# Patient Record
Sex: MALE | HISPANIC OR LATINO | URBAN - METROPOLITAN AREA
[De-identification: names, ages, dates, MRNs, and addresses within clinical notes are randomized per-mention and may not be internally consistent; named-entity substitution may affect disease eponyms.]

---

## 2024-03-04 PROBLEM — M79.672 PAIN OF LEFT FOOT: Status: ACTIVE | Noted: 2024-03-04

## 2024-03-12 ENCOUNTER — HOSPITAL ENCOUNTER (OUTPATIENT)
Facility: MEDICAL CENTER | Age: 39
End: 2024-03-12
Attending: EMERGENCY MEDICINE | Admitting: ORTHOPAEDIC SURGERY
Payer: SELF-PAY

## 2024-03-12 ENCOUNTER — APPOINTMENT (OUTPATIENT)
Dept: RADIOLOGY | Facility: MEDICAL CENTER | Age: 39
End: 2024-03-12
Attending: ORTHOPAEDIC SURGERY

## 2024-03-12 ENCOUNTER — ANESTHESIA EVENT (OUTPATIENT)
Dept: SURGERY | Facility: MEDICAL CENTER | Age: 39
End: 2024-03-12

## 2024-03-12 ENCOUNTER — ANESTHESIA (OUTPATIENT)
Dept: SURGERY | Facility: MEDICAL CENTER | Age: 39
End: 2024-03-12

## 2024-03-12 VITALS
OXYGEN SATURATION: 92 % | HEIGHT: 69 IN | HEART RATE: 99 BPM | DIASTOLIC BLOOD PRESSURE: 78 MMHG | RESPIRATION RATE: 16 BRPM | WEIGHT: 265 LBS | BODY MASS INDEX: 39.25 KG/M2 | TEMPERATURE: 97.4 F | SYSTOLIC BLOOD PRESSURE: 147 MMHG

## 2024-03-12 DIAGNOSIS — S82.302A CLOSED FRACTURE OF DISTAL END OF LEFT TIBIA, UNSPECIFIED FRACTURE MORPHOLOGY, INITIAL ENCOUNTER: ICD-10-CM

## 2024-03-12 DIAGNOSIS — M79.605 LEFT LEG PAIN: ICD-10-CM

## 2024-03-12 PROCEDURE — A9270 NON-COVERED ITEM OR SERVICE: HCPCS | Performed by: STUDENT IN AN ORGANIZED HEALTH CARE EDUCATION/TRAINING PROGRAM

## 2024-03-12 PROCEDURE — 700111 HCHG RX REV CODE 636 W/ 250 OVERRIDE (IP): Performed by: STUDENT IN AN ORGANIZED HEALTH CARE EDUCATION/TRAINING PROGRAM

## 2024-03-12 PROCEDURE — 700105 HCHG RX REV CODE 258: Performed by: STUDENT IN AN ORGANIZED HEALTH CARE EDUCATION/TRAINING PROGRAM

## 2024-03-12 PROCEDURE — 27695 REPAIR OF ANKLE LIGAMENT: CPT | Mod: LT | Performed by: ORTHOPAEDIC SURGERY

## 2024-03-12 PROCEDURE — 700101 HCHG RX REV CODE 250: Performed by: STUDENT IN AN ORGANIZED HEALTH CARE EDUCATION/TRAINING PROGRAM

## 2024-03-12 PROCEDURE — 700102 HCHG RX REV CODE 250 W/ 637 OVERRIDE(OP): Performed by: EMERGENCY MEDICINE

## 2024-03-12 PROCEDURE — 160036 HCHG PACU - EA ADDL 30 MINS PHASE I: Performed by: ORTHOPAEDIC SURGERY

## 2024-03-12 PROCEDURE — 27814 TREATMENT OF ANKLE FRACTURE: CPT | Mod: LT | Performed by: ORTHOPAEDIC SURGERY

## 2024-03-12 PROCEDURE — 700102 HCHG RX REV CODE 250 W/ 637 OVERRIDE(OP): Performed by: STUDENT IN AN ORGANIZED HEALTH CARE EDUCATION/TRAINING PROGRAM

## 2024-03-12 PROCEDURE — 27829 TREAT LOWER LEG JOINT: CPT | Mod: ASROC,LT | Performed by: NURSE PRACTITIONER

## 2024-03-12 PROCEDURE — 700111 HCHG RX REV CODE 636 W/ 250 OVERRIDE (IP): Performed by: ORTHOPAEDIC SURGERY

## 2024-03-12 PROCEDURE — 160035 HCHG PACU - 1ST 60 MINS PHASE I: Performed by: ORTHOPAEDIC SURGERY

## 2024-03-12 PROCEDURE — A9270 NON-COVERED ITEM OR SERVICE: HCPCS | Performed by: EMERGENCY MEDICINE

## 2024-03-12 PROCEDURE — 27829 TREAT LOWER LEG JOINT: CPT | Mod: LT | Performed by: ORTHOPAEDIC SURGERY

## 2024-03-12 PROCEDURE — 27814 TREATMENT OF ANKLE FRACTURE: CPT | Mod: ASROC,LT | Performed by: NURSE PRACTITIONER

## 2024-03-12 PROCEDURE — 160025 RECOVERY II MINUTES (STATS): Performed by: ORTHOPAEDIC SURGERY

## 2024-03-12 PROCEDURE — 36415 COLL VENOUS BLD VENIPUNCTURE: CPT

## 2024-03-12 PROCEDURE — 110371 HCHG SHELL REV 272: Performed by: ORTHOPAEDIC SURGERY

## 2024-03-12 PROCEDURE — 28446 OPN OSTEOCHONDRL AGRFT TALUS: CPT | Mod: LT | Performed by: ORTHOPAEDIC SURGERY

## 2024-03-12 PROCEDURE — 27695 REPAIR OF ANKLE LIGAMENT: CPT | Mod: ASROC,LT | Performed by: NURSE PRACTITIONER

## 2024-03-12 PROCEDURE — 160009 HCHG ANES TIME/MIN: Performed by: ORTHOPAEDIC SURGERY

## 2024-03-12 PROCEDURE — C1713 ANCHOR/SCREW BN/BN,TIS/BN: HCPCS | Performed by: ORTHOPAEDIC SURGERY

## 2024-03-12 PROCEDURE — 28446 OPN OSTEOCHONDRL AGRFT TALUS: CPT | Mod: ASROC,LT | Performed by: NURSE PRACTITIONER

## 2024-03-12 PROCEDURE — 160002 HCHG RECOVERY MINUTES (STAT): Performed by: ORTHOPAEDIC SURGERY

## 2024-03-12 PROCEDURE — 700111 HCHG RX REV CODE 636 W/ 250 OVERRIDE (IP): Mod: JZ | Performed by: STUDENT IN AN ORGANIZED HEALTH CARE EDUCATION/TRAINING PROGRAM

## 2024-03-12 PROCEDURE — 160041 HCHG SURGERY MINUTES - EA ADDL 1 MIN LEVEL 4: Performed by: ORTHOPAEDIC SURGERY

## 2024-03-12 PROCEDURE — 160029 HCHG SURGERY MINUTES - 1ST 30 MINS LEVEL 4: Performed by: ORTHOPAEDIC SURGERY

## 2024-03-12 PROCEDURE — 160048 HCHG OR STATISTICAL LEVEL 1-5: Performed by: ORTHOPAEDIC SURGERY

## 2024-03-12 PROCEDURE — 73610 X-RAY EXAM OF ANKLE: CPT | Mod: LT

## 2024-03-12 PROCEDURE — 99291 CRITICAL CARE FIRST HOUR: CPT

## 2024-03-12 PROCEDURE — 160046 HCHG PACU - 1ST 60 MINS PHASE II: Performed by: ORTHOPAEDIC SURGERY

## 2024-03-12 DEVICE — WIRE FIXATION KIRSCHNER L150 MM OD1.6 MM: Type: IMPLANTABLE DEVICE | Site: ANKLE | Status: FUNCTIONAL

## 2024-03-12 DEVICE — PLATE BONE 1/3 TUBULAR L83 MM FOOT ANKLE 7 HOLE NONSTERILE: Type: IMPLANTABLE DEVICE | Site: ANKLE | Status: FUNCTIONAL

## 2024-03-12 DEVICE — DEVICE FIXATION GRAVITY SYNCHFIX SYNDESMOSIS (1EA): Type: IMPLANTABLE DEVICE | Site: ANKLE | Status: FUNCTIONAL

## 2024-03-12 DEVICE — SCREW BONE VARIAX T10 FULL THREAD L14 MM OD3.5 MM FOOT ANKLE STARDRIVE NONSTERILE: Type: IMPLANTABLE DEVICE | Site: ANKLE | Status: FUNCTIONAL

## 2024-03-12 DEVICE — SCREW BONE VARIAX T10 FULL THREAD L12 MM OD3.5 MM FOOT ANKLE STARDRIVE NONSTERILE: Type: IMPLANTABLE DEVICE | Site: ANKLE | Status: FUNCTIONAL

## 2024-03-12 RX ORDER — HYDRALAZINE HYDROCHLORIDE 20 MG/ML
5 INJECTION INTRAMUSCULAR; INTRAVENOUS
Status: DISCONTINUED | OUTPATIENT
Start: 2024-03-12 | End: 2024-03-12 | Stop reason: HOSPADM

## 2024-03-12 RX ORDER — CELECOXIB 200 MG/1
200 CAPSULE ORAL 2 TIMES DAILY
Status: DISCONTINUED | OUTPATIENT
Start: 2024-03-12 | End: 2024-03-12 | Stop reason: HOSPADM

## 2024-03-12 RX ORDER — OXYCODONE HYDROCHLORIDE 5 MG/1
5 TABLET ORAL
Status: DISCONTINUED | OUTPATIENT
Start: 2024-03-12 | End: 2024-03-12 | Stop reason: HOSPADM

## 2024-03-12 RX ORDER — OXYCODONE HCL 5 MG/5 ML
5 SOLUTION, ORAL ORAL
Status: COMPLETED | OUTPATIENT
Start: 2024-03-12 | End: 2024-03-12

## 2024-03-12 RX ORDER — ACETAMINOPHEN 500 MG
1000 TABLET ORAL EVERY 6 HOURS
Status: DISCONTINUED | OUTPATIENT
Start: 2024-03-12 | End: 2024-03-12 | Stop reason: HOSPADM

## 2024-03-12 RX ORDER — ONDANSETRON 2 MG/ML
INJECTION INTRAMUSCULAR; INTRAVENOUS PRN
Status: DISCONTINUED | OUTPATIENT
Start: 2024-03-12 | End: 2024-03-12 | Stop reason: SURG

## 2024-03-12 RX ORDER — CELECOXIB 200 MG/1
200 CAPSULE ORAL 2 TIMES DAILY PRN
Status: DISCONTINUED | OUTPATIENT
Start: 2024-03-17 | End: 2024-03-12 | Stop reason: HOSPADM

## 2024-03-12 RX ORDER — LABETALOL HYDROCHLORIDE 5 MG/ML
5 INJECTION, SOLUTION INTRAVENOUS
Status: DISCONTINUED | OUTPATIENT
Start: 2024-03-12 | End: 2024-03-12 | Stop reason: HOSPADM

## 2024-03-12 RX ORDER — OXYCODONE HYDROCHLORIDE AND ACETAMINOPHEN 5; 325 MG/1; MG/1
1 TABLET ORAL ONCE
Status: COMPLETED | OUTPATIENT
Start: 2024-03-12 | End: 2024-03-12

## 2024-03-12 RX ORDER — CEFAZOLIN SODIUM 1 G/3ML
INJECTION, POWDER, FOR SOLUTION INTRAMUSCULAR; INTRAVENOUS PRN
Status: DISCONTINUED | OUTPATIENT
Start: 2024-03-12 | End: 2024-03-12 | Stop reason: HOSPADM

## 2024-03-12 RX ORDER — BUPIVACAINE HYDROCHLORIDE 5 MG/ML
INJECTION, SOLUTION EPIDURAL; INTRACAUDAL
Status: DISCONTINUED | OUTPATIENT
Start: 2024-03-12 | End: 2024-03-12 | Stop reason: HOSPADM

## 2024-03-12 RX ORDER — MIDAZOLAM HYDROCHLORIDE 1 MG/ML
INJECTION INTRAMUSCULAR; INTRAVENOUS PRN
Status: DISCONTINUED | OUTPATIENT
Start: 2024-03-12 | End: 2024-03-12 | Stop reason: SURG

## 2024-03-12 RX ORDER — IPRATROPIUM BROMIDE AND ALBUTEROL SULFATE 2.5; .5 MG/3ML; MG/3ML
3 SOLUTION RESPIRATORY (INHALATION)
Status: DISCONTINUED | OUTPATIENT
Start: 2024-03-12 | End: 2024-03-12 | Stop reason: HOSPADM

## 2024-03-12 RX ORDER — HALOPERIDOL 5 MG/ML
1 INJECTION INTRAMUSCULAR
Status: DISCONTINUED | OUTPATIENT
Start: 2024-03-12 | End: 2024-03-12 | Stop reason: HOSPADM

## 2024-03-12 RX ORDER — DEXAMETHASONE SODIUM PHOSPHATE 4 MG/ML
INJECTION, SOLUTION INTRA-ARTICULAR; INTRALESIONAL; INTRAMUSCULAR; INTRAVENOUS; SOFT TISSUE PRN
Status: DISCONTINUED | OUTPATIENT
Start: 2024-03-12 | End: 2024-03-12 | Stop reason: SURG

## 2024-03-12 RX ORDER — OXYCODONE HYDROCHLORIDE 5 MG/1
5 TABLET ORAL EVERY 4 HOURS PRN
Qty: 20 TABLET | Refills: 0 | Status: SHIPPED | OUTPATIENT
Start: 2024-03-12 | End: 2024-03-17

## 2024-03-12 RX ORDER — METOPROLOL TARTRATE 1 MG/ML
1 INJECTION, SOLUTION INTRAVENOUS
Status: DISCONTINUED | OUTPATIENT
Start: 2024-03-12 | End: 2024-03-12 | Stop reason: HOSPADM

## 2024-03-12 RX ORDER — HYDROMORPHONE HYDROCHLORIDE 1 MG/ML
0.2 INJECTION, SOLUTION INTRAMUSCULAR; INTRAVENOUS; SUBCUTANEOUS
Status: DISCONTINUED | OUTPATIENT
Start: 2024-03-12 | End: 2024-03-12 | Stop reason: HOSPADM

## 2024-03-12 RX ORDER — OXYCODONE HCL 5 MG/5 ML
10 SOLUTION, ORAL ORAL
Status: COMPLETED | OUTPATIENT
Start: 2024-03-12 | End: 2024-03-12

## 2024-03-12 RX ORDER — ASPIRIN 81 MG/1
81 TABLET ORAL 2 TIMES DAILY
Status: DISCONTINUED | OUTPATIENT
Start: 2024-03-12 | End: 2024-03-12 | Stop reason: HOSPADM

## 2024-03-12 RX ORDER — LIDOCAINE HYDROCHLORIDE 20 MG/ML
INJECTION, SOLUTION EPIDURAL; INFILTRATION; INTRACAUDAL; PERINEURAL PRN
Status: DISCONTINUED | OUTPATIENT
Start: 2024-03-12 | End: 2024-03-12 | Stop reason: SURG

## 2024-03-12 RX ORDER — HYDROMORPHONE HYDROCHLORIDE 1 MG/ML
0.4 INJECTION, SOLUTION INTRAMUSCULAR; INTRAVENOUS; SUBCUTANEOUS
Status: DISCONTINUED | OUTPATIENT
Start: 2024-03-12 | End: 2024-03-12 | Stop reason: HOSPADM

## 2024-03-12 RX ORDER — SODIUM CHLORIDE, SODIUM LACTATE, POTASSIUM CHLORIDE, CALCIUM CHLORIDE 600; 310; 30; 20 MG/100ML; MG/100ML; MG/100ML; MG/100ML
INJECTION, SOLUTION INTRAVENOUS
Status: DISCONTINUED | OUTPATIENT
Start: 2024-03-12 | End: 2024-03-12 | Stop reason: HOSPADM

## 2024-03-12 RX ORDER — EPHEDRINE SULFATE 50 MG/ML
5 INJECTION, SOLUTION INTRAVENOUS
Status: DISCONTINUED | OUTPATIENT
Start: 2024-03-12 | End: 2024-03-12 | Stop reason: HOSPADM

## 2024-03-12 RX ORDER — HYDROMORPHONE HYDROCHLORIDE 1 MG/ML
0.5 INJECTION, SOLUTION INTRAMUSCULAR; INTRAVENOUS; SUBCUTANEOUS
Status: DISCONTINUED | OUTPATIENT
Start: 2024-03-12 | End: 2024-03-12 | Stop reason: HOSPADM

## 2024-03-12 RX ORDER — ONDANSETRON 2 MG/ML
4 INJECTION INTRAMUSCULAR; INTRAVENOUS
Status: DISCONTINUED | OUTPATIENT
Start: 2024-03-12 | End: 2024-03-12 | Stop reason: HOSPADM

## 2024-03-12 RX ORDER — OXYCODONE HYDROCHLORIDE 5 MG/1
10 TABLET ORAL
Status: DISCONTINUED | OUTPATIENT
Start: 2024-03-12 | End: 2024-03-12 | Stop reason: HOSPADM

## 2024-03-12 RX ORDER — DIPHENHYDRAMINE HYDROCHLORIDE 50 MG/ML
12.5 INJECTION INTRAMUSCULAR; INTRAVENOUS
Status: DISCONTINUED | OUTPATIENT
Start: 2024-03-12 | End: 2024-03-12 | Stop reason: HOSPADM

## 2024-03-12 RX ORDER — HYDROMORPHONE HYDROCHLORIDE 1 MG/ML
0.1 INJECTION, SOLUTION INTRAMUSCULAR; INTRAVENOUS; SUBCUTANEOUS
Status: DISCONTINUED | OUTPATIENT
Start: 2024-03-12 | End: 2024-03-12 | Stop reason: HOSPADM

## 2024-03-12 RX ORDER — ACETAMINOPHEN 500 MG
1000 TABLET ORAL EVERY 6 HOURS PRN
Status: DISCONTINUED | OUTPATIENT
Start: 2024-03-17 | End: 2024-03-12 | Stop reason: HOSPADM

## 2024-03-12 RX ADMIN — ROCURONIUM BROMIDE 50 MG: 10 INJECTION, SOLUTION INTRAVENOUS at 08:29

## 2024-03-12 RX ADMIN — OXYCODONE HYDROCHLORIDE AND ACETAMINOPHEN 1 TABLET: 5; 325 TABLET ORAL at 07:03

## 2024-03-12 RX ADMIN — FENTANYL CITRATE 50 MCG: 50 INJECTION, SOLUTION INTRAMUSCULAR; INTRAVENOUS at 10:00

## 2024-03-12 RX ADMIN — HYDROMORPHONE HYDROCHLORIDE 0.4 MG: 1 INJECTION, SOLUTION INTRAMUSCULAR; INTRAVENOUS; SUBCUTANEOUS at 10:13

## 2024-03-12 RX ADMIN — OXYCODONE HYDROCHLORIDE 10 MG: 5 SOLUTION ORAL at 10:00

## 2024-03-12 RX ADMIN — FENTANYL CITRATE 50 MCG: 50 INJECTION, SOLUTION INTRAMUSCULAR; INTRAVENOUS at 09:36

## 2024-03-12 RX ADMIN — MIDAZOLAM HYDROCHLORIDE 2 MG: 1 INJECTION, SOLUTION INTRAMUSCULAR; INTRAVENOUS at 08:26

## 2024-03-12 RX ADMIN — CEFAZOLIN 3 G: 1 INJECTION, POWDER, FOR SOLUTION INTRAMUSCULAR; INTRAVENOUS at 08:31

## 2024-03-12 RX ADMIN — PROPOFOL 150 MG: 10 INJECTION, EMULSION INTRAVENOUS at 08:29

## 2024-03-12 RX ADMIN — FENTANYL CITRATE 50 MCG: 50 INJECTION, SOLUTION INTRAMUSCULAR; INTRAVENOUS at 08:38

## 2024-03-12 RX ADMIN — SUGAMMADEX 200 MG: 100 INJECTION, SOLUTION INTRAVENOUS at 09:43

## 2024-03-12 RX ADMIN — HYDROMORPHONE HYDROCHLORIDE 0.2 MG: 1 INJECTION, SOLUTION INTRAMUSCULAR; INTRAVENOUS; SUBCUTANEOUS at 10:45

## 2024-03-12 RX ADMIN — DEXAMETHASONE SODIUM PHOSPHATE 8 MG: 4 INJECTION INTRA-ARTICULAR; INTRALESIONAL; INTRAMUSCULAR; INTRAVENOUS; SOFT TISSUE at 08:31

## 2024-03-12 RX ADMIN — LIDOCAINE HYDROCHLORIDE 100 MG: 20 INJECTION, SOLUTION EPIDURAL; INFILTRATION; INTRACAUDAL at 08:26

## 2024-03-12 RX ADMIN — ROCURONIUM BROMIDE 10 MG: 10 INJECTION, SOLUTION INTRAVENOUS at 08:47

## 2024-03-12 RX ADMIN — SODIUM CHLORIDE, POTASSIUM CHLORIDE, SODIUM LACTATE AND CALCIUM CHLORIDE: 600; 310; 30; 20 INJECTION, SOLUTION INTRAVENOUS at 08:23

## 2024-03-12 RX ADMIN — FENTANYL CITRATE 50 MCG: 50 INJECTION, SOLUTION INTRAMUSCULAR; INTRAVENOUS at 08:26

## 2024-03-12 RX ADMIN — ONDANSETRON 4 MG: 2 INJECTION INTRAMUSCULAR; INTRAVENOUS at 08:31

## 2024-03-12 RX ADMIN — HYDROMORPHONE HYDROCHLORIDE 0.4 MG: 1 INJECTION, SOLUTION INTRAMUSCULAR; INTRAVENOUS; SUBCUTANEOUS at 10:25

## 2024-03-12 RX ADMIN — FENTANYL CITRATE 50 MCG: 50 INJECTION, SOLUTION INTRAMUSCULAR; INTRAVENOUS at 10:11

## 2024-03-12 ASSESSMENT — PAIN SCALES - GENERAL: PAIN_LEVEL: 5

## 2024-03-12 ASSESSMENT — PAIN DESCRIPTION - PAIN TYPE
TYPE: SURGICAL PAIN
TYPE: SURGICAL PAIN
TYPE: ACUTE PAIN
TYPE: SURGICAL PAIN

## 2024-03-12 NOTE — ANESTHESIA POSTPROCEDURE EVALUATION
Patient: Manuel Reid    Procedure Summary       Date: 03/12/24 Room / Location: [facility] 12 / SURGERY [facility]    Anesthesia Start: 0823 Anesthesia Stop: 0957    Procedure: ORIF, ANKLE, SYNDESMOSIS REPAIR, DRILLING OSTEOCHONDRAL DEFECT, DELTOID REPAIR (Left: Ankle) Diagnosis: (left ankle fracture and syndesmotic disruption, osteochondral defect)    Surgeons: Russ Singh M.D. Responsible Provider: Russ Mohan M.D.    Anesthesia Type: general ASA Status: 3 - Emergent            Final Anesthesia Type: general  Last vitals  BP   Blood Pressure: (!) 147/78    Temp   36.3 °C (97.4 °F)    Pulse   99   Resp   16    SpO2   92 %      Anesthesia Post Evaluation    Patient location during evaluation: PACU  Patient participation: complete - patient participated  Level of consciousness: awake and alert  Pain score: 5    Airway patency: patent  Anesthetic complications: no  Cardiovascular status: hemodynamically stable  Respiratory status: acceptable  Hydration status: euvolemic    PONV: none          No notable events documented.     Nurse Pain Score: 5 (NPRS)

## 2024-03-12 NOTE — ANESTHESIA PREPROCEDURE EVALUATION
Case: [id number] Date/Time: 03/12/24 0915    Procedure: ORIF, ANKLE (Left)    Location: [facility] OR 12 / SURGERY [facility]    Surgeons: JENNY Louis.    Relevant Problems   No relevant active problems       Physical Exam    Airway   Mallampati: II  TM distance: >3 FB  Neck ROM: full       Cardiovascular - normal exam  Rhythm: regular  Rate: normal  (-) murmur     Dental - normal exam  (+) upper dentures      Very poor dentition     Pulmonary - normal exam  Breath sounds clear to auscultation     Abdominal    Neurological - normal exam                   Anesthesia Plan    ASA 3- EMERGENT   ASA physical status 3 criteria: hypertension - poorly controlled and morbid obesity - BMI greater than or equal to 40ASA physical status emergent criteria: displaced fracture with possible neurovascular compromise    Plan - general       Airway plan will be ETT          Induction: intravenous    Postoperative Plan: Postoperative administration of opioids is intended.    Pertinent diagnostic labs and testing reviewed    Informed Consent:    Anesthetic plan and risks discussed with patient.    Use of blood products discussed with: patient whom consented to blood products.

## 2024-03-12 NOTE — ED PROVIDER NOTES
"  ER Provider Note    Scribed for Jameel Johnson M.D. by Evelina Sutherland. 3/12/2024   6:41 AM    Primary Care Provider: None noted    CHIEF COMPLAINT  Chief Complaint   Patient presents with    Leg Pain     Patient to triage via wheelchair c/o left leg pain after a tibular fracture, followed up with ortho and has surgery scheduled but reports pain is unbearable. Is prescribed Norco 5/325, patient current has cast in place. CMS intact, unable to palpate pulse due to bulky ortho cast.      EXTERNAL RECORDS REVIEWED  Outpatient Notes: The patient was seen in office on 3/4/24 for left ankle pain.     HPI/ROS  LIMITATION TO HISTORY   Select: : None  OUTSIDE HISTORIAN(S):  Family Manuel Reid is a 38 y.o. male who presents to the ED for evaluation of left leg pain onset 8 days ago. He reports he was seen in office for his leg pain but has not been scheduled for surgery. He reports he was diagnosed with a tibular fracture. He states he was given Norco but has uncontrolled pain.     PAST MEDICAL HISTORY  History reviewed. No pertinent past medical history.    SURGICAL HISTORY  History reviewed. No pertinent surgical history.    FAMILY HISTORY  None noted    SOCIAL HISTORY   reports that he has never smoked. He has never used smokeless tobacco.    CURRENT MEDICATIONS  Previous Medications    GABAPENTIN (NEURONTIN) 300 MG CAP    1 po q hs prn nerve pain    HYDROXYZINE PAMOATE (VISTARIL) 25 MG CAP    Take 1 Capsule by mouth 3 times a day as needed for Itching or Other (AND NAUSEA) for up to 5 days.    OXYCODONE IMMEDIATE-RELEASE (ROXICODONE) 5 MG TAB    Take 1 Tablet by mouth every four hours as needed for Severe Pain for up to 7 days.       ALLERGIES  No Known Allergies     PHYSICAL EXAM  BP (!) 168/97   Pulse (!) 102   Temp 36.8 °C (98.2 °F) (Temporal)   Resp 20   Ht 1.753 m (5' 9\")   Wt 120 kg (265 lb)   SpO2 92%   BMI 39.13 kg/m²    Nursing note and vitals reviewed.  Constitutional: Well-developed and " well-nourished. No distress.   HENT: Head is normocephalic and atraumatic. Oropharynx is clear and moist without exudate or erythema.   Eyes: Pupils are equal, round, and reactive to light. Conjunctiva are normal.   Cardiovascular: Normal rate and regular rhythm. No murmur heard. Normal radial pulses.  Pulmonary/Chest: Breath sounds normal. No wheezes or rales.   Abdominal: Soft and non-tender. No distention    Musculoskeletal: Right lower extremity is in a splint, sensation intact in the toes, able to move the toes    Neurological: Awake, alert and oriented to person, place, and time. No focal deficits noted.  Skin: Skin is warm and dry. No rash.   Psychiatric: Normal mood and affect. Appropriate for clinical situation    INITIAL ASSESSMENT AND PLAN    6:41 AM - Patient was evaluated at bedside for left leg pain. The patient will be medicated with Percocet 5-325 for his symptoms. The patient was urged to present to the [facility] to schedule his surgery as planned with ortho previously. Patient verbalizes understanding and support with my plan of care.      ED Observation Status? No; Patient does not meet criteria for ED Observation.      COURSE AND MEDICAL DECISION MAKING    7:08 AM - Per RN, the patient states he was not able to get in with ortho because of insurance difficulties. Paged ortho.     7:48 AM - I discussed the patient's case and the above findings with Dr. Singh (hospitalist) who will take the patient to the OR. Patient updated and agreeable.      DISPOSITION AND DISCUSSIONS    I have discussed management of the patient with the following physicians and RHIANNON's:  Dr. Singh (hospitalist)     DISPOSITION:  Patient will be hospitalized by Dr. Singh (hospitalist) in guarded condition.    FINAL DIAGNOSIS  1. Left leg pain    2. Closed fracture of distal end of left tibia, unspecified fracture morphology, initial encounter         Evelina LAMA (Scribe), am scribing for, and in the presence of, Jameel LLAMAS  JENNY Johnson.    Electronically signed by: Evelina Sutherland (Scribe), 3/12/2024    IJameel M.D. personally performed the services described in this documentation, as scribed by Evelina Sutherland in my presence, and it is both accurate and complete.      The note accurately reflects work and decisions made by me.  Jameel Johnson M.D.  3/12/2024  12:51 PM

## 2024-03-12 NOTE — CONSULTS
OR time available this am for ORIF Left unstable ankle fracture.  Patient previously seen and evaluated in my clinic.    Russ Singh MD

## 2024-03-12 NOTE — OR NURSING
Pt's VSS; denies N/V; states pain is at tolerable level. Dressing CDI to Left lower leg. D/c orders received. IV dc'd. Pt changed into clothing with assistance. Discharge instructions given as well as pain management handout; pt and family verbalized understanding and questions answered. Patient states ready to d/c home. Prescriptions e-sent to preferred pharmacy. Pt dc'd in w/c with RN in stable condition.

## 2024-03-12 NOTE — H&P
Surgery Orthopedic History & Physical Note    Date  3/12/2024    Primary Care Physician  Pcp Pt States None    CC  * No Diagnosis Codes entered *    HPI  Subjective   Patient ID:  Manuel Reid is a 38 y.o. male.     Chief Complaint:  Follow-Up of the Left Ankle     Last Surgery: No surgery found on No surgery found     HPI Manuel is a very pleasant 38-year-old gentleman who slipped and fell on the ice this morning.  He presented to [facility] with an unstable left ankle fracture.  He denies numbness or tingling.  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     Review of Systems he and his wife have 2 children.  He is otherwise healthy.           Objective   Ortho Exam  Alert and oriented no apparent distress.  Chest breathing comfortably, heart regular rate and rhythm.  He has circumferential moderate to severe swelling around his right ankle.  He has a small abrasion on the medial ankle but his skin is intact.  He is neurovascularly intact.     Last Imaging Result(s):   DX-ANKLE 3+ VIEWS LEFT  New x-rays taken today AP lateral and mortise view show a Brito C level   fibula fracture with clear medial clear space and syndesmotic instability.              Assessment & Plan   Encounter Diagnoses:   Acute left ankle pain     Closed bimalleolar fracture of left ankle, initial encounter     Pain of left foot         Orders Placed This Encounter    Casting    Surgical Case Request: ORIF, ANKLE, RECONSTRUCTION, LIGAMENT, ANKLE      Manuel is a pleasant 38-year-old gentleman with an unstable left ankle fracture and syndesmotic disruption.  I like him splinted for soft tissue rest.  He will be indicated for open reduction internal fixation with syndesmotic repair and possible deltoid repair.  I have filled out a scheduling form and look forward to seeing him on a scheduled date, we will do this at which ever facility works best for him and his family.  He will be weightbearing as tolerated in a boot postoperatively.  I would like  him to start physical therapy if he is able 1 to 2 weeks postoperatively.  Return for Post-Op, Imaging.       History reviewed. No pertinent past medical history.    History reviewed. No pertinent surgical history.    Current Facility-Administered Medications   Medication Dose Route Frequency Provider Last Rate Last Admin    [MAR Hold] Pharmacy Consult Request ...Pain Management Review 1 Each  1 Each Other PHARMACY TO DOSE Russ Singh M.D.        [MAR Hold] aspirin EC tablet 81 mg  81 mg Oral BID Russ Singh M.D.        [MAR Hold] acetaminophen (Tylenol) tablet 1,000 mg  1,000 mg Oral Q6HRS Russ Singh M.D.        Followed by    [MAR Hold] acetaminophen (Tylenol) tablet 1,000 mg  1,000 mg Oral Q6HRS PRN Russ Singh M.D.        [MAR Hold] celecoxib (CeleBREX) capsule 200 mg  200 mg Oral BID Russ Singh M.D.        Followed by    [MAR Hold] celecoxib (CeleBREX) capsule 200 mg  200 mg Oral BID PRN Russ Singh M.D.        [MAR Hold] oxyCODONE immediate-release (Roxicodone) tablet 5 mg  5 mg Oral Q3HRS PRN Russ Singh M.D.        Or    [MAR Hold] oxyCODONE immediate-release (Roxicodone) tablet 10 mg  10 mg Oral Q3HRS PRN Russ Singh M.D.        Or    [MAR Hold] HYDROmorphone (Dilaudid) injection 0.5 mg  0.5 mg Intravenous Q3HRS PRN Russ Singh M.D.           Social History     Socioeconomic History    Marital status: Unknown     Spouse name: Not on file    Number of children: Not on file    Years of education: Not on file    Highest education level: Not on file   Occupational History    Not on file   Tobacco Use    Smoking status: Never    Smokeless tobacco: Never   Substance and Sexual Activity    Alcohol use: Not on file    Drug use: Not on file    Sexual activity: Not on file   Other Topics Concern    Not on file   Social History Narrative    Not on file     Social Determinants of Health     Financial Resource Strain: Not on file   Food Insecurity: Not on file    Transportation Needs: Not on file   Physical Activity: Not on file   Stress: Not on file   Social Connections: Not on file   Intimate Partner Violence: Not on file   Housing Stability: Not on file       No family history on file.    Allergies  Patient has no known allergies.    Review of Systems  Negative    Physical Exam    Vital Signs  Blood Pressure: (!) 141/87   Temperature: 36.6 °C (97.8 °F)   Pulse: (!) 106   Respiration: 18   Pulse Oximetry: 94 %   Heart: RRR  Chest: Breathing comfortably      Labs:                    Radiology:  DX-PORTABLE FLUORO > 1 HOUR    (Results Pending)   DX-ANKLE 3+ VIEWS LEFT    (Results Pending)         Assessment/Plan:  * No Diagnosis Codes entered *  Procedure(s):  ORIF, ANKLE

## 2024-03-12 NOTE — ANESTHESIA PROCEDURE NOTES
Airway    Date/Time: 3/12/2024 8:31 AM    Performed by: Russ Mohan M.D.  Authorized by: Russ Mohan M.D.    Location:  OR  Urgency:  Elective  Indications for Airway Management:  Anesthesia      Spontaneous Ventilation: absent    Sedation Level:  Deep  Preoxygenated: Yes    Patient Position:  Sniffing  Final Airway Type:  Endotracheal airway  Final Endotracheal Airway:  ETT  Cuffed: Yes    Technique Used for Successful ETT Placement:  Video laryngoscopy    Insertion Site:  Oral  Blade Type:  Mony  Laryngoscope Blade/Videolaryngoscope Blade Size:  3  ETT Size (mm):  7.5  Measured from:  Teeth  ETT to Teeth (cm):  21  Placement Verified by: auscultation and capnometry    Cormack-Lehane Classification:  Grade I - full view of glottis  Number of Attempts at Approach:  1  Ventilation Between Attempts:  None  Number of Other Approaches Attempted:  0   Copious secretions, short neck and poor dentition

## 2024-03-12 NOTE — OP REPORT
SURGEON:  Russ Singh MD      PREOPERATIVE DIAGNOSIS:    1.  Left bimalleolar ankle fracture dislocation  2.  Left syndesmotic disruption  3.  Left deltoid ligament disruption    POSTOPERATIVE DIAGNOSIS:    1.  Left bimalleolar ankle fracture dislocation  2.  Left syndesmotic disruption  3.  Left deltoid ligament disruption  4.  Left 4 mm x 5 mm medial talar dome osteochondral defect..    PROCEDURES PERFORMED:      1.  Open reduction internal fixation left bimalleolar ankle fracture  2.  Left open reduction internal fixation syndesmotic disruption  3.  Left open deltoid repair   4.  Drilling of the left medial talar dome osteochondral defect      ASSISTANT: FELECIA Hall    ANESTHESIA:   General with 30 cc local half percent Marcaine without epinephrine    IMPLANTS: [place] 7 hole one third tubular plate with 3.5 millimeter screws; Lo Medical SynchFix syndesmotic fixation device    TOURNIQUET TIME: 53 minutes at 250 mmHg    EBL: 50 cc    COMPLICATIONS:  None.    DISPOSITION:  Stable to Post Anesthesia Care Unit.    INDICATIONS: Role is a pleasant 38-year-old gentleman sustained the injury to his left ankle and was initially seen [facility].  I saw him in clinic and he was splinted for soft tissue rest given the marked instability he had.  He has now been splinted and is amenable to surgical repair.    PROCEDURE IN DETAIL:   Greeted in the preop holding area and identified by name medical record number.  The left lower extremity was marked.  Risks of the procedure including bleeding, infection, pain, malunion, nonunion, neurovascular damage and need for more surgery were discussed and he provided written consent.  He was taken to the operating room and placed on table in supine position.  Preop antibiotics were administered and general anesthesia was induced.  A nonsterile tourniquet was placed on the left thigh and the left lower extremity prepped and draped in the usual sterile fashion.  An  operative pause was taken where all present were in agreement with patient identification, laterality and procedure to be performed.  The limb was elevated for 5 minutes and the tourniquet raised to 250 mmHg.    Radiographically we identified the Brito C level fibula fracture.  We made an 8 cm longitudinal incision over the, there was a large posterior butterfly fragment.  Blunt dissection was carried down through peroneal fascia.  We spent a significant amount of time mobilizing the fragments and meticulously cleaning them out.  We used multiple point-to-point reduction clamps to hold it in place.  We pinned it in place with K wires.  We selected a lateral one third tubular plate and secured all fragments with 3.5 millimeter screws.  Fixation and maintenance of reduction of the ankle was excellent.  When stressing the ankle with noted persistent significant instability of the deltoid and the syndesmosis.    We made a 4 cm curvilinear incision anterior to the posterior tibial tendon.  Blunt dissection was carried down to the deltoid which was completely torn mid substance.  I did feel there was adequate tissue for repair.  Part of the deltoid was flipped into the joint making reduction difficult.  We copiously irrigated the joint.  I did note a full-thickness osteochondral defect along the medial talar dome.  We debrided this back to stable borders and removed osteochondral fragments from the joint.  We used a 0.062 inch K wire to drill a hole in the center of it for marrow stimulation.  The joint was again copiously irrigated.    We held the ankle reduced shoulder repaired the deltoid and pants over vest fashion with 0 Vicryl suture in figure-of-eight fashion.  This maintained an anatomic reduction of the ankle mortise.  We did have persistent instability of his foot and widening of the syndesmosis.    We made a 3 cm longitudinal incision laterally over the distal fibula.  We dissected anteriorly to evaluate the  reduction of the syndesmosis.  We used a point-to-point reduction clamp to hold it into anatomic alignment.  We did so while we drilled for and placed a ProNerve SynchFix syndesmotic fixation device parallel to the joint and slight posterior to anterior direction.  On tightening and removal of the clamp to maintain an anatomic reduction of the ankle mortise.  No evidence of interval complication.  Tourniquet was let down and hemostasis achieved.  Incisions copiously irrigated.  Deep layers closed with 3-0 Monocryl in figure-of-eight fashion.  Subcutaneous layer closed with 3-0 Monocryl buried erupted fashion.  Skin closed with 3-0 nylon in horizontal mattress fashion.  Adaptic gauze and a well-padded posterior splint with stirrup was placed.  He was awakened and extubated in stable condition.    POSTOPERATIVE COURSE: He will discharge home today assuming adequate pain control mobilization.  Nonweightbearing left lower extremity.  I will see him in 10 to 14 days for suture removal and wound check.  At this point I am hopeful that his boot has come in the mail and we will transition him to the boot and begin therapy if we can.    Russ Singh MD

## 2024-03-12 NOTE — ED TRIAGE NOTES
"Chief Complaint   Patient presents with    Leg Pain     Patient to triage via wheelchair c/o left leg pain after a tibular fracture, followed up with ortho and has surgery scheduled but reports pain is unbearable. Is prescribed Norco 5/325, patient current has cast in place. CMS intact, unable to palpate pulse due to bulky ortho cast.        BP (!) 153/116   Pulse (!) 120   Temp 36.8 °C (98.2 °F) (Temporal)   Resp 20   Ht 1.753 m (5' 9\")   Wt 120 kg (265 lb)   SpO2 96%     Patient educated on ed triage process, instructed to notify staff of any new or worsening symptoms, verbalizes understanding. Patient returned to ed lobby, apologized for wait times.     "

## 2024-03-12 NOTE — ANESTHESIA TIME REPORT
Anesthesia Start and Stop Event Times       Date Time Event    3/12/2024 0815 Ready for Procedure     0823 Anesthesia Start     0957 Anesthesia Stop          Responsible Staff  03/12/24      Name Role Begin End    Russ Mohan M.D. Anesth 0823 0957          Overtime Reason:  no overtime (within assigned shift)    Comments:

## 2024-03-12 NOTE — ANESTHESIA POSTPROCEDURE EVALUATION
Patient: Manuel Reid    Procedure Summary       Date: 03/12/24 Room / Location: [facility] 12 / SURGERY [facility]    Anesthesia Start: 0823 Anesthesia Stop: 0957    Procedure: ORIF, ANKLE, SYNDESMOSIS REPAIR, DRILLING OSTEOCHONDRAL DEFECT, DELTOID REPAIR (Left: Ankle) Diagnosis: (left ankle fracture and syndesmotic disruption, osteochondral defect)    Surgeons: Russ Singh M.D. Responsible Provider: Russ Mohan M.D.    Anesthesia Type: general ASA Status: 3 - Emergent            Final Anesthesia Type: general  Last vitals  BP   Blood Pressure: (!) 147/78    Temp   36.3 °C (97.4 °F)    Pulse   99   Resp   16    SpO2   92 %      Anesthesia Post Evaluation    Patient location during evaluation: PACU  Patient participation: complete - patient participated  Level of consciousness: awake and alert  Pain score: 5    Airway patency: patent  Anesthetic complications: no  Cardiovascular status: hemodynamically stable  Respiratory status: acceptable  Hydration status: euvolemic    PONV: none          No notable events documented.     Nurse Pain Score: 5 (NPRS)

## 2024-03-12 NOTE — OR NURSING
Report called to Tessa CHILDRESS. Plan of care discussed. Patient reports tolerable 5/10 throbbing in ankle. No complaints of nausea. VSS. Patient expresses readiness to proceed to discharge. Dressing CDI with ankle elevated on pillow.

## 2024-03-12 NOTE — DISCHARGE INSTRUCTIONS
HOME CARE INSTRUCTIONS    ACTIVITY: Rest and take it easy for the first 24 hours.  A responsible adult is recommended to remain with you during that time.  It is normal to feel sleepy.  We encourage you to not do anything that requires balance, judgment or coordination.    FOR 24 HOURS DO NOT:  Drive, operate machinery or run household appliances.  Drink beer or alcoholic beverages.  Make important decisions or sign legal documents.    SPECIAL INSTRUCTIONS:    Non-weight bearing left lower extremity  Make appointment at [facility] when boot comes to transition to boot  Ice and elevate  Stay ahead of pain  Keep splint clean and dry  Aspirin 81mg two times a day for clot prevention  Refer to JAMES packet for further instructions     DIET: To avoid nausea, slowly advance diet as tolerated, avoiding spicy or greasy foods for the first day.  Add more substantial food to your diet according to your physician's instructions.  Babies can be fed formula or breast milk as soon as they are hungry.  INCREASE FLUIDS AND FIBER TO AVOID CONSTIPATION.    MEDICATIONS: Resume taking daily medication.  Take prescribed pain medication with food.  If no medication is prescribed, you may take non-aspirin pain medication if needed.  PAIN MEDICATION CAN BE VERY CONSTIPATING.  Take a stool softener or laxative such as senokot, pericolace, or milk of magnesia if needed.    Prescription given for vistaril, gabapentin, and oxycodone.  Last pain medication given at 10 am.    A follow-up appointment should be arranged with your doctor; call to schedule.    You should CALL YOUR PHYSICIAN if you develop:  Fever greater than 101 degrees F.  Pain not relieved by medication, or persistent nausea or vomiting.  Excessive bleeding (blood soaking through dressing) or unexpected drainage from the wound.  Extreme redness or swelling around the incision site, drainage of pus or foul smelling drainage.  Inability to urinate or empty your bladder within 8  hours.  Problems with breathing or chest pain.    You should call 911 if you develop problems with breathing or chest pain.  If you are unable to contact your doctor or surgical center, you should go to the nearest emergency room or urgent care center.  Physician's telephone #: [phone] ([facility] - Dr. Singh)    MILD FLU-LIKE SYMPTOMS ARE NORMAL.  YOU MAY EXPERIENCE GENERALIZED MUSCLE ACHES, THROAT IRRITATION, HEADACHE AND/OR SOME NAUSEA.    If any questions arise, call your doctor.  If your doctor is not available, please feel free to call the Surgical Center at [phone].  The Center is open Monday through Friday from 7AM to 7PM.      A registered nurse may call you a few days after your surgery to see how you are doing after your procedure.    You may also receive a survey in the mail within the next two weeks and we ask that you take a few moments to complete the survey and return it to us.  Our goal is to provide you with very good care and we value your comments.     Depression / Suicide Risk    As you are discharged from this [facility], it is important to learn how to keep safe from harming yourself.    Recognize the warning signs:  Abrupt changes in personality, positive or negative- including increase in energy   Giving away possessions  Change in eating patterns- significant weight changes-  positive or negative  Change in sleeping patterns- unable to sleep or sleeping all the time   Unwillingness or inability to communicate  Depression  Unusual sadness, discouragement and loneliness  Talk of wanting to die  Neglect of personal appearance   Rebelliousness- reckless behavior  Withdrawal from people/activities they love  Confusion- inability to concentrate     If you or a loved one observes any of these behaviors or has concerns about self-harm, here's what you can do:  Talk about it- your feelings and reasons for harming yourself  Remove any means that you might use to  hurt yourself (examples: pills, rope, extension cords, firearm)  Get professional help from the community (Mental Health, Substance Abuse, psychological counseling)  Do not be alone:Call your Safe Contact- someone whom you trust who will be there for you.  Call your local CRISIS HOTLINE 174-0111 or 165-907-2055  Call your local Children's Mobile Crisis Response Team [place] (154) 947-5038 or www.Dustcloud  Call the toll free National Suicide Prevention Hotlines   National Suicide Prevention Lifeline 963-364-OIYD (1953)  [facility] Line Network 800-SUICIDE (511-6220)    I acknowledge receipt and understanding of these Home Care instructions.

## (undated) DEVICE — GLOVE BIOGEL PI ORTHO SZ 7.5 PF LF (40PR/BX)

## (undated) DEVICE — SLEEVE, VASO, THIGH, MED

## (undated) DEVICE — SET LEADWIRE 5 LEAD BEDSIDE DISPOSABLE ECG (1SET OF 5/EA)

## (undated) DEVICE — BIT DRILL L122MM OD3.5MM NONSTERILE OVER ADD ON FITTING

## (undated) DEVICE — GLOVE BIOGEL PI INDICATOR SZ 7.0 SURGICAL PF LF - (50/BX 4BX/CA)

## (undated) DEVICE — MASK  AIRWAY SIZE 5 UNIQUE SILICON (10EA/BX)

## (undated) DEVICE — BIT DRILL DIA2.6MM SCALED FOR VARIAX 2 WRIST FUSION LOCKING PLATE SYSTEM

## (undated) DEVICE — GLOVE BIOGEL INDICATOR SZ 8.5 SURGICAL PF LTX - (50/BX 4BX/CA)

## (undated) DEVICE — LACTATED RINGERS INJ 1000 ML - (14EA/CA 60CA/PF)

## (undated) DEVICE — TUBE E-T HI-LO CUFF 7.5MM (10EA/PK)

## (undated) DEVICE — SENSOR OXIMETER ADULT SPO2 RD SET (20EA/BX)

## (undated) DEVICE — SPLINT PLASTER 5 IN X 30 IN - (50EA/BX 6BX/CA)

## (undated) DEVICE — DRAPE LARGE 3 QUARTER - (20/CA)

## (undated) DEVICE — TUBE CONNECT SUCTION CLEAR 120 X 1/4" (50EA/CA)"

## (undated) DEVICE — DRESSING 3X8 ADAPTIC GAUZE - NON-ADHERING (36/PK 6PK/BX)

## (undated) DEVICE — DRESSING ABDOMINAL PAD STERILE 8 X 10" (360EA/CA)"

## (undated) DEVICE — CANISTER SUCTION 3000ML MECHANICAL FILTER AUTO SHUTOFF MEDI-VAC NONSTERILE LF DISP  (40EA/CA)

## (undated) DEVICE — SUTURE GENERAL

## (undated) DEVICE — GVL 3 STAT DISPOSABLE - (10/BX)

## (undated) DEVICE — CHLORAPREP 26 ML APPLICATOR - ORANGE TINT(25/CA)

## (undated) DEVICE — TOURNIQUET CUFF 34 X 4 ONE PORT DISP - STERILE (10/BX)

## (undated) DEVICE — GLOVE SZ 7 BIOGEL PI MICRO - PF LF (50PR/BX 4BX/CA)

## (undated) DEVICE — SUCTION INSTRUMENT YANKAUER BULBOUS TIP W/O VENT (50EA/CA)

## (undated) DEVICE — GLOVE BIOGEL SZ 8 SURGICAL PF LTX - (50PR/BX 4BX/CA)

## (undated) DEVICE — PAD LAP STERILE 18 X 18 - (5/PK 40PK/CA)

## (undated) DEVICE — SODIUM CHL IRRIGATION 0.9% 1000ML (12EA/CA)

## (undated) DEVICE — SUTURE 0 VICRYL PLUS CT-2 - 8 X 18 INCH (12/BX)

## (undated) DEVICE — SET EXTENSION WITH 2 PORTS (48EA/CA) ***PART #2C8610 IS A SUBSTITUTE*****

## (undated) DEVICE — ELECTRODE DUAL RETURN W/ CORD - (50/PK)

## (undated) DEVICE — STOCKINET BIAS 6 IN STERILE - (20/CA)

## (undated) DEVICE — SUTURE 3-0 MONOCRYL PLUS PS-1 - 27 INCH (36/BX)

## (undated) DEVICE — PACK LOWER EXTREMITY - (2/CA)

## (undated) DEVICE — TUBING CLEARLINK DUO-VENT - C-FLO (48EA/CA)

## (undated) DEVICE — PADDING CAST 6 IN STERILE - 6 X 4 YDS (24/CA)

## (undated) DEVICE — GOWN WARMING STANDARD FLEX - (30/CA)

## (undated) DEVICE — SUTURE 3-0 ETHILON PS-1 (36PK/BX)

## (undated) DEVICE — WRAP CO-FLEX 4IN X 5YD STERIL - SELF-ADHERENT (18/CA)

## (undated) DEVICE — DRAPE 36X28IN RAD CARM BND BG - (25/CA) O

## (undated) DEVICE — BLADE SURGICAL #15 - (50/BX 3BX/CA)

## (undated) DEVICE — COVER LIGHT HANDLE ALC PLUS DISP (18EA/BX)